# Patient Record
Sex: MALE | Race: OTHER | Employment: FULL TIME | URBAN - METROPOLITAN AREA
[De-identification: names, ages, dates, MRNs, and addresses within clinical notes are randomized per-mention and may not be internally consistent; named-entity substitution may affect disease eponyms.]

---

## 2017-07-11 ENCOUNTER — HOSPITAL ENCOUNTER (OUTPATIENT)
Age: 42
Discharge: ANOTHER HOSPITAL | End: 2017-07-11
Payer: COMMERCIAL

## 2017-07-11 VITALS
TEMPERATURE: 98 F | WEIGHT: 225 LBS | OXYGEN SATURATION: 98 % | BODY MASS INDEX: 31.5 KG/M2 | HEIGHT: 71 IN | HEART RATE: 57 BPM | RESPIRATION RATE: 16 BRPM | DIASTOLIC BLOOD PRESSURE: 92 MMHG | SYSTOLIC BLOOD PRESSURE: 145 MMHG

## 2017-07-11 DIAGNOSIS — R07.9 ACUTE CHEST PAIN: Primary | ICD-10-CM

## 2017-07-11 PROCEDURE — 99215 OFFICE O/P EST HI 40 MIN: CPT

## 2017-07-11 PROCEDURE — 93010 ELECTROCARDIOGRAM REPORT: CPT | Performed by: NURSE PRACTITIONER

## 2017-07-11 PROCEDURE — 93010 ELECTROCARDIOGRAM REPORT: CPT

## 2017-07-11 PROCEDURE — 99203 OFFICE O/P NEW LOW 30 MIN: CPT

## 2017-07-11 PROCEDURE — 93005 ELECTROCARDIOGRAM TRACING: CPT

## 2017-07-11 NOTE — ED PROVIDER NOTES
Patient presents with:  Dizziness (neurologic)      HPI:     This 35year old male presents with a chief complaint of chest pain. Patient reports today around 945 this morning he began having chest pain and shortness of breath while at work.    Patient rep bradycardia. No acute changes noted. Ectopy noted no    Diagnosis:    ICD-10-CM    1. Acute chest pain R07.9        All results reviewed and discussed with patient. See AVS for detailed discharge instructions for your condition today.     Follow Up with

## 2017-07-11 NOTE — ED INITIAL ASSESSMENT (HPI)
While at work today he started to feel slightly dizzy and just not right. He took his blood pressure and it was high.   Sl nausea, no vomiting